# Patient Record
(demographics unavailable — no encounter records)

---

## 2024-12-11 NOTE — HISTORY OF PRESENT ILLNESS
[Y] : Patient reports abnormal menses [N] : Patient denies prior pregnancies [FreeTextEntry1] : Summer presents today with irregular cycles, prone to cyst, wants to discuss test results, having a lot of pain during menstrual and before. she said she faints and is having a lot of pelvic pain as of recently. she is also bleeding a lot less during cycle. 2-menses lasting3 days and light q month having nausea and pain during menses had cholecystetomy for cholecystitis back in may; was suppose to f/u with dr morse for EGD. review of usg from april- nl ut, nl ovaries b/l with b/l simple cyst found slightly over 1 cm reassurance decline ocp's for dysmenorrhea    [LMPDate] : 12/10/24

## 2024-12-11 NOTE — PLAN
[FreeTextEntry1] : 1-usg for dysmenorrhea- review of causes of dysmenorrhea and usg findings (if any) 2. hormone panel for light menses 3. incidental finding of renal cyst- to see pcp and referral for usg of kidneys given 4. telehealth after usg performed

## 2025-01-13 NOTE — HISTORY OF PRESENT ILLNESS
[FreeTextEntry1] : This visit was conducted with the use of interactive audio and video telecommunication system that permits real time communication between the provider and the patient. Verbal consent for the visit obtained on 01/13/2025 Originating site: Provider office, [address] [place] [address] Distant Site: Patient's Home, [address] [place] [address]  painful menses; started mid 2024 only lexapro; not SA no changes in weight menses became irregular in April skipped 1 months; menses lasts only 2 days;  always had pelvic pain

## 2025-01-13 NOTE — HISTORY OF PRESENT ILLNESS
[FreeTextEntry1] : This visit was conducted with the use of interactive audio and video telecommunication system that permits real time communication between the provider and the patient. Verbal consent for the visit obtained on 01/13/2025 Originating site: Provider office, [address] [place] [address] Distant Site: Patient's Home, [address] [place] [address]  painful menses; started mid 2024 only lexapro; not SA no changes in weight menses became irregular in April skipped 1 months; menses lasts only 2 days;  always had pelvic pain

## 2025-01-22 NOTE — HISTORY OF PRESENT ILLNESS
[FreeTextEntry1] : CPE [de-identified] : This is a 21F with SAJAN (on lexapro) here for CPE.  1) Post-ED visit at [facility] for ?panic attack versus seizure; reports taking her sick partner to an urgent care when she started "convulsing" where her arms and legs were shaking. She remembers episode fully and denies any post-ictal state, LOC, vision changes, headache, neck pain or related symptoms. Admits to some anxiety leading up to the episode. She was taken to [facility] ED where she had EKG and CTH both unremarkable/negative. Only notable lab was WBC 14. 2) Planning on getting EGD and C-scope with Dr Leija 1/30/2025 and 2/6/2025 for chronic diarrhea w/ mucus. Ever since her cholecystectomy 4/2024, she's been having diarrhea (multiple times per day) with mucus. +pain with defecation. Denies any bloody BMs, unintentional weight loss, family history of colitis or colon ca.  Social history: Works as a . Social [employer] use, no tobacco or drug use. Lives with mom. Sexually active/Last menstrual period: Not on birth control, does not want to start it. GYN Dr Gauthier, not SA no pap smear. Regular periods, LMP 1/9/2025.

## 2025-01-22 NOTE — HEALTH RISK ASSESSMENT
[Very Good] : ~his/her~  mood as very good [Yes] : Yes [2 - 4 times a month (2 pts)] : 2-4 times a month (2 points) [1 or 2 (0 pts)] : 1 or 2 (0 points) [Never (0 pts)] : Never (0 points) [No falls in past year] : Patient reported no falls in the past year [0] : 2) Feeling down, depressed, or hopeless: Not at all (0) [PHQ-2 Negative - No further assessment needed] : PHQ-2 Negative - No further assessment needed [Never] : Never [HIV test declined] : HIV test declined [Hepatitis C test declined] : Hepatitis C test declined [With Family] : lives with family [Employed] : employed [Significant Other] : lives with significant other [Fully functional (bathing, dressing, toileting, transferring, walking, feeding)] : Fully functional (bathing, dressing, toileting, transferring, walking, feeding) [Fully functional (using the telephone, shopping, preparing meals, housekeeping, doing laundry, using] : Fully functional and needs no help or supervision to perform IADLs (using the telephone, shopping, preparing meals, housekeeping, doing laundry, using transportation, managing medications and managing finances) [Seat Belt] :  uses seat belt [[id number]] : 0 [Reports changes in hearing] : Reports no changes in hearing [Reports changes in vision] : Reports no changes in vision [Reports changes in dental health] : Reports no changes in dental health [PapSmearComments] : Not SA [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

## 2025-01-22 NOTE — ASSESSMENT
[FreeTextEntry1] : HEALTH CARE MAINTENANCE - Influenza vaccine: Defer - Covid vaccine: Defer - HIV:  Defer - HEP C: Defer - HBV: UTD - TDAP: UTD  RTC 1 year or earlier prn

## 2025-02-05 NOTE — PHYSICAL EXAM
[Normal] : no acute distress, well nourished, well developed and well-appearing [No Respiratory Distress] : no respiratory distress  [No Accessory Muscle Use] : no accessory muscle use [Speech Grossly Normal] : speech grossly normal [Normal Affect] : the affect was normal [Normal Insight/Judgement] : insight and judgment were intact

## 2025-02-06 NOTE — HISTORY OF PRESENT ILLNESS
[Home] : at home, [unfilled] , at the time of the visit. [Medical Office: ([facility])___] : at the medical office located in  [Verbal consent obtained from patient] : the patient, [unfilled] [FreeTextEntry1] : follow up [de-identified] : Discussed with patient: You have chosen to receive care through the use of tele-media. Tele-media enables health care providers at different locations to provide safe, effective and convenient care through the use of technology. Please note that this is a billable encounter. As with any health care service, there are risks associated with the use of tele-media, including equipment failure, poor image and/or resolution, and  issues. You understand that I cannot physically examine you and that you may need to come to the clinic to complete the assessment. Patient agreed verbally to understanding the risks and benefits of tele-media as explained. All questions regarding tele-media encounters were answered. Total time spent: [15]  This is a 21F with SAJAN (on lexapro), here for f/u. In the interim since last visit she had EGD done which showed mucosal changes concerning for George's esophagus. She is pending colonoscopy tomorrow for chronic diarrhea.  She has also been started on OCPs (loestrin) for pelvic pain c/f endometriosis. She plans to start taking it after her current menses finishes.

## 2025-02-06 NOTE — HISTORY OF PRESENT ILLNESS
[Home] : at home, [unfilled] , at the time of the visit. [Medical Office: ([facility])___] : at the medical office located in  [Verbal consent obtained from patient] : the patient, [unfilled] [FreeTextEntry1] : follow up [de-identified] : Discussed with patient: You have chosen to receive care through the use of tele-media. Tele-media enables health care providers at different locations to provide safe, effective and convenient care through the use of technology. Please note that this is a billable encounter. As with any health care service, there are risks associated with the use of tele-media, including equipment failure, poor image and/or resolution, and  issues. You understand that I cannot physically examine you and that you may need to come to the clinic to complete the assessment. Patient agreed verbally to understanding the risks and benefits of tele-media as explained. All questions regarding tele-media encounters were answered. Total time spent: [15]  This is a 21F with SAJAN (on lexapro), here for f/u. In the interim since last visit she had EGD done which showed mucosal changes concerning for George's esophagus. She is pending colonoscopy tomorrow for chronic diarrhea.  She has also been started on OCPs (loestrin) for pelvic pain c/f endometriosis. She plans to start taking it after her current menses finishes.

## 2025-04-23 NOTE — HISTORY OF PRESENT ILLNESS
[FreeTextEntry1] : 22 yo patient presents for follow up from starting Lo lo estrin Fe. She had an allergic reaction to rash and hives bumps all over her body that started 3 weeks after medication. When she started the medication, she had sharp stabbing pain in her pelvic area immediately.  She also has breast tenderness all of the time but noticed that past 2 weeks have been extremely tender.   She has been off of medication for 1 month and has not had menses for 77 days.  has constant pelvic pain; went to see gi and  w/u was normal pt does state ocp did make pelvic pain less had point tenderness on bladder; no symptoms will see uorlogy and if all neg try Kyleena pain described as stabbing with no precipitating events

## 2025-04-23 NOTE — PHYSICAL EXAM
[Appropriately responsive] : appropriately responsive [Alert] : alert [No Acute Distress] : no acute distress [No Lymphadenopathy] : no lymphadenopathy [No Murmurs] : no murmurs [Soft] : soft [Non-tender] : non-tender [Non-distended] : non-distended [No HSM] : No HSM [No Lesions] : no lesions [No Mass] : no mass [Oriented x3] : oriented x3 [Labia Majora] : normal [Labia Minora] : normal [Normal] : normal [Uterine Adnexae] : normal [FreeTextEntry6] : + point tenderness on bladder